# Patient Record
Sex: FEMALE
[De-identification: names, ages, dates, MRNs, and addresses within clinical notes are randomized per-mention and may not be internally consistent; named-entity substitution may affect disease eponyms.]

---

## 2020-09-10 ENCOUNTER — HOSPITAL ENCOUNTER (OUTPATIENT)
Dept: HOSPITAL 76 - DI | Age: 35
Discharge: HOME | End: 2020-09-10
Attending: STUDENT IN AN ORGANIZED HEALTH CARE EDUCATION/TRAINING PROGRAM
Payer: COMMERCIAL

## 2020-09-10 DIAGNOSIS — Z01.818: Primary | ICD-10-CM

## 2020-09-10 DIAGNOSIS — N83.202: ICD-10-CM

## 2020-09-10 DIAGNOSIS — N85.9: ICD-10-CM

## 2020-09-10 PROCEDURE — 72197 MRI PELVIS W/O & W/DYE: CPT

## 2020-09-10 PROCEDURE — 80053 COMPREHEN METABOLIC PANEL: CPT

## 2020-09-10 PROCEDURE — 71046 X-RAY EXAM CHEST 2 VIEWS: CPT

## 2020-09-10 PROCEDURE — 36415 COLL VENOUS BLD VENIPUNCTURE: CPT

## 2020-09-10 NOTE — MRI REPORT
PROCEDURE:  Pelvis W/WO

 

INDICATIONS:  MOLAR PREGNANCY

 

CONTRAST:  IV CONTRAST: Gadavist ml: 8.5  

 

TECHNIQUE:  

Coronal ultra fast SE, sagittal breath-hold T2 FSE; axial T1 FSE with and without fat saturation thro
ugh the pelvis.  Optional long- and short-axis uterine nonbreath-hold T2 FSE through the uterus.  Sag
ittal or axial dynamic ultra fast GE during administration of contrast.  Post-contrast axial or coron
al ultra fast GE / 2-D spoiled GE with fat saturation from the iliac crests to the symphysis.  Option
al diffusion weighted imaging and ADC may be performed.  

 

COMPARISON:  None.

 

FINDINGS:  

Image quality:  Excellent.  

 

Uterus:  The uterus is mildly enlarged. There is a heterogeneous mass distending the endometrial cavi
ty demonstrating predominantly T1 hypointensity and T2 hyperintensity with numerous small internal cy
stic spaces. There are a few areas of intrinsic T1-hyperintensity within the mass consistent with blo
od product from foci of hemorrhage. Following contrast administration there is heterogeneous hypervas
cular enhancement with multiple small internal nonenhancing cystic spaces. There is thickening of the
 junctional zone in the lower uterine segment.

 

Adnexa:  Both ovaries are normal in size. There is a thin-walled nonenhancing cyst within the left ov
lizz measuring up to 2.9 cm.

 

Urinary system:  Bladder wall is normal in thickness.  Distal ureters are non distended.  Urethra dianne
ears normal in morphology.  

 

Nodes and vessels:  No pelvic or inguinal adenopathy by size criteria.  Iliac vessels are normal in s
ize.  

 

Bowel and peritoneum:  No pathologic free pelvic fluid.  Inferior colon and small bowel loops are nor
mal in caliber.  

 

Soft tissues:  No inguinal hernias.  No findings of pelvic floor incompetence in the absence of provo
cation.  

 

Bones:  Marrow demonstrates normal overall signal. There is mild periarticular edema and enhancement 
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
osis.

 

IMPRESSION:  

 

1. Heterogeneous hypervascular mass with internal cystic spaces demonstrated within the uterine cavit
y consistent with a complete hydatidiform mole.

 

2. Thin-walled simple appearing left ovarian cyst without evidence of ovarian enlargement likely repr
esents a physiologic follicular cyst. No definite evidence of thecal lutein cysts.

 

Reviewed by: Oscar Del Valle MD on 9/10/2020 3:33 PM PDT

Approved by: Oscar Del Valle MD on 9/10/2020 3:33 PM PDT

 

 

Station ID:  [id number]

## 2020-09-10 NOTE — XRAY REPORT
PROCEDURE:  Chest 2 View X-Ray

 

INDICATIONS:  Molar pregnancy; preoperative evaluation and risk assessment

 

TECHNIQUE:  2 view(s) of the chest.  

 

COMPARISON:  None.

 

FINDINGS:  

 

Surgical changes and devices:  None.  

 

Lungs and pleura:  No pleural effusions or pneumothorax.  Lungs are clear.  

 

Mediastinum:  Mediastinal contours are normal.  Heart size is normal.  

 

Bones and chest wall:  No suspicious bony abnormalities.  Soft tissues appear unremarkable.  

 

IMPRESSION:  No acute cardiopulmonary process demonstrated radiographically.

 

Reviewed by: Chaka Ahumada MD on 9/10/2020 10:45 AM PDT

Approved by: Chaka Ahumada MD on 9/10/2020 10:45 AM PDT

 

 

Station ID:  [id number]

## 2020-09-11 ENCOUNTER — HOSPITAL ENCOUNTER (OUTPATIENT)
Dept: HOSPITAL 76 - SDS | Age: 35
Discharge: HOME | End: 2020-09-11
Attending: STUDENT IN AN ORGANIZED HEALTH CARE EDUCATION/TRAINING PROGRAM
Payer: COMMERCIAL

## 2020-09-11 VITALS — SYSTOLIC BLOOD PRESSURE: 127 MMHG | DIASTOLIC BLOOD PRESSURE: 89 MMHG

## 2020-09-11 DIAGNOSIS — O01.0: ICD-10-CM

## 2020-09-11 DIAGNOSIS — J45.909: ICD-10-CM

## 2020-09-11 DIAGNOSIS — O01.9: Primary | ICD-10-CM

## 2020-09-11 LAB
BASOPHILS NFR BLD AUTO: 0.1 10^3/UL (ref 0–0.1)
BASOPHILS NFR BLD AUTO: 0.6 %
EOSINOPHIL # BLD AUTO: 0.2 10^3/UL (ref 0–0.7)
EOSINOPHIL NFR BLD AUTO: 2.4 %
ERYTHROCYTE [DISTWIDTH] IN BLOOD BY AUTOMATED COUNT: 13.4 % (ref 12–15)
HGB UR QL STRIP: 13.7 G/DL (ref 12–16)
LYMPHOCYTES # SPEC AUTO: 2.5 10^3/UL (ref 1.5–3.5)
LYMPHOCYTES NFR BLD AUTO: 27.3 %
MCH RBC QN AUTO: 30.7 PG (ref 27–31)
MCHC RBC AUTO-ENTMCNC: 36.1 G/DL (ref 32–36)
MCV RBC AUTO: 85 FL (ref 81–99)
MONOCYTES # BLD AUTO: 0.4 10^3/UL (ref 0–1)
MONOCYTES NFR BLD AUTO: 4.7 %
NEUTROPHILS # BLD AUTO: 5.7 10^3/UL (ref 1.5–6.6)
NEUTROPHILS # SNV AUTO: 9 X10^3/UL (ref 4.8–10.8)
NEUTROPHILS NFR BLD AUTO: 63.8 %
PDW BLD AUTO: 8.6 FL (ref 7.9–10.8)
PLATELET # BLD: 361 10^3/UL (ref 130–450)
RBC MAR: 4.46 10^6/UL (ref 4.2–5.4)

## 2020-09-11 PROCEDURE — 85025 COMPLETE CBC W/AUTO DIFF WBC: CPT

## 2020-09-11 NOTE — ANESTHESIA
Pre-Anesthesia VS, & Labs





- Diagnosis





molar pregnancy





- Procedure





Suction D&C


Vital Signs: 





                                        











Temp Pulse Resp BP Pulse Ox


 


 37.3 C   93   18   139/93 H  100 


 


 09/11/20 06:41  09/11/20 06:41  09/11/20 06:41  09/11/20 06:41  09/11/20 06:41














                                        





Height                           5 ft 4.96 in


Weight (kg)                      81 kg











- NPO


>8 hours





- Pregnancy


Is Patient Pregnant?: No (molar)





- Lab Results


Lab results reviewed: Yes





Home Medications and Allergies


Home Medications: 


Ambulatory Orders





Cetirizine [ZyrTEC] 10 mg PO DAILY 09/09/20 


Loratadine [Claritin] 10 mg PO DAILY 09/09/20 


Pnv No.95/Ferrous Fum/Folic AC [Prenatal Caplet] 1 each PO DAILY 09/09/20 











Active Medications





Atropine Sulfate ()  0.5 mg IVP Q5M PRN


   PRN Reason: Bradycardia


   Stop: 09/12/20 06:55


Ephedrine Sulfate ()  10 mg IVP Q5M PRN


   PRN Reason: HYPOTENSION


   Stop: 09/12/20 06:55


Fentanyl (Fentanyl)  25 - 50 mcg IVP Q5M PRN


   PRN Reason: BREAKTHROUGH PAIN (2nd Choice)


   Stop: 09/12/20 06:55


Hydromorphone HCl (Dilaudid Inj Syringe)  0.2 - 0.6 mg IVP Q5M PRN


   PRN Reason: PAIN (First Choice)


   Stop: 09/12/20 06:55


Lactated Ringer's (Lr)  1,000 mls @ 100 mls/hr IV .Q10H FRANCY


   Stop: 09/11/20 16:59


Metoclopramide HCl (Reglan Inj)  10 mg IVP Q6HR PRN


   PRN Reason: N/V not relieved by Zofran


Morphine Sulfate (Morphine (Carpuject))  2 - 4 mg IVP Q5M PRN


   PRN Reason: PAIN (3rd Choice)


   Stop: 09/12/20 06:55


Naloxone HCl (Narcan)  0.1 mg IVP Q2M PRN


   PRN Reason: RESP RATE <8


   Stop: 09/12/20 06:55


Ondansetron HCl (Zofran Inj)  4 mg IVP ONCE PRN


   PRN Reason: N/V (First Choice)


   Stop: 09/12/20 06:55





                                        





Cetirizine [ZyrTEC] 10 mg PO DAILY 09/09/20 


Loratadine [Claritin] 10 mg PO DAILY 09/09/20 


Pnv No.95/Ferrous Fum/Folic AC [Prenatal Caplet] 1 each PO DAILY 09/09/20 








Allergies/Adverse Reactions: 


                                    Allergies











Allergy/AdvReac Type Severity Reaction Status Date / Time


 


diphenhydramine Allergy Intermediate Unknown Verified 09/11/20 06:50





[From Benadryl]     


 


dextromethorphan Allergy  Hives Verified 09/11/20 06:50





[From Delsym]     














Anes History & Medical History





- Anesthetic History


Anesthesia Complications: reports: No previous complications


Family history of Anesthesia Complications: Denies


Family history of Malignant Hyperthermia: Denies





- Medical History


Cardiovascular: reports: None


Pulmonary: reports: Asthma


Gastrointestinal: reports: None


Urinary: reports: None


Musculoskeletal: reports: None


Endocrine/Autoimmune: reports: None


Skin: reports: None





- Surgical History


General: Cholecystectomy





Exam


General: Alert, Oriented x3, Cooperative


Dental: WNL


Mouth Opening: 3 Fingerbreadth


Neck Mobility: Normal


Mallampati classification: I


Thyromental Distance: 4-6 cm


Respiratory: Lungs clear, Normal breath sounds, No respiratory distress


Cardiovascular: Regular rate


Neurological: Normal speech


Mental/Cognitive Status: Alert/Oriented X3, Normal for patient


Cognitive Status: Within normal limits





Plan


Anesthesia Type: General


Consent for Procedure(s) Verified and Reviewed: Yes


Code Status: Attempt Resuscitation


ASA classification: 2-Mild systemic disease


Is this case an emergency?: No

## 2020-09-11 NOTE — ANESTHESIA POST OP EVALUATION
Anesthesia Post Eval





- Post Anesthesia Eval


Vitals: 





                                Last Vital Signs











Temp  36.4 C L  09/11/20 09:58


 


Pulse  62   09/11/20 09:58


 


Resp  16   09/11/20 09:58


 


BP  127/89 H  09/11/20 09:58


 


Pulse Ox  99   09/11/20 09:58











CV Function Including HR & BP: positive: Stable


Pain Control: positive: Satisfactory


Nausea & Vomiting: positive: Negative


Mental Status: positive: Baseline


Respiratory Status: Airway Patent


Hydration Status: Satisfactory

## 2020-09-11 NOTE — OPERATIVE REPORT
Operative Report





- General


Procedure Date: 20


Planned Procedure: 





Suction Dilation and Curettage


Pre-Op Diagnosis: Complete Molar Pregnancy


Procedure Performed: 





Suction Dilation and Curettage


Post Op Diagnosis: Complete Molar Pregnancy





- Procedure Note


Primary Surgeon: Mima Boles MD


Anesthesia Provider: Kiran


Anesthesia Technique: General LMA


IV Fluids (mL): 850


Estimated Blood Loss (mL): 200


Urine Output (mL): 75


Indications: 





34 year old female  at 11+0 weeks EGA diagnosed with complete molar 

pregnancy.  Recommended for suction dilation and curettage.  Patient was 

counseled and consented for the procedure.


Findings: 





Exam under anesthesia:  Approximately 10 week sized uterus, anteverted and 

mobile, and normal contour.  No adnexal masses.  Cervix closed and long.


Operative findings:  Uterus sounded to 10.5cm.  10mm curved suction curette used

under abdominal ultrasound guidance.  Endometrial stripe measured 7mm 

transabdominally at the end of the procedure.


Complications: 





None





- Other


Other Information/Narrative: 





The patient was counseled and consented for the procedure.  She was taken to the

operating room where general LMA anesthesia was obtained without complication.  

A surgical time-out was performed.  The patient was prepped and draped in the 

usual sterile fashion in yellow-fin stirrups.  The bladder was drained with a 

straight catheter.  A bivalve speculum was placed in the vagina.  The cervical 

os was noted to be closed.  The anterior lip of the cervix was grasped with a 

single toothed tenaculum.  A local paracervical block using 1% lidocaine with 

epinephrine was placed at 2, 4, 8, and 10 o'clock positions of the cervix.  The 

uterus was sounded to 10.5cm.  The cervix was dilated to #11 Stefanie dilator.  The

suction machine was tested and had suction level to green.  The 10mm curved 

suction curette was inserted and suction curettage was performed under 

transabdominal ultrasound guidance, with removal of moderate amount of tissue.  

The suction curette was removed.  Sharp curettage was performed with a medium 

sized curette under ultrasound guidance until good cry was noted throughout the 

uterine cavity, and the tissue sent for pathology.  Final suction curettage was 

performed with scant tissue removed.  Final transabdominal ultrasound 

measurement of the endometrial stripe was 7mm.  The tenaculum was removed from 

the cervix and the tenaculum sites were hemostatic with pressure.  The speculum 

was removed from the vagina.  All instruments were removed from the vagina.  The

patient tolerated the procedure well.  She was awakened from anesthesia without 

complication and transferred to PACU in stable condition.